# Patient Record
Sex: FEMALE | Race: WHITE | NOT HISPANIC OR LATINO | Employment: OTHER | URBAN - NONMETROPOLITAN AREA
[De-identification: names, ages, dates, MRNs, and addresses within clinical notes are randomized per-mention and may not be internally consistent; named-entity substitution may affect disease eponyms.]

---

## 2017-03-30 ENCOUNTER — HOSPITAL ENCOUNTER (OUTPATIENT)
Dept: RADIOLOGY | Facility: OTHER | Age: 72
End: 2017-03-30

## 2017-03-30 ENCOUNTER — HISTORY (OUTPATIENT)
Dept: RADIOLOGY | Facility: OTHER | Age: 72
End: 2017-03-30

## 2017-03-30 DIAGNOSIS — Z12.31 ENCOUNTER FOR SCREENING MAMMOGRAM FOR MALIGNANT NEOPLASM OF BREAST: ICD-10-CM

## 2018-01-04 NOTE — PROGRESS NOTES
Patient Information     Patient Name MRN Sex Kiera Julien [id number] Female 1945      Progress Notes by Krystin Velarde at 3/30/2017 11:08 AM     Author:  Krystin Velarde Service:  (none) Author Type:  (none)     Filed:  3/30/2017 11:08 AM Date of Service:  3/30/2017 11:08 AM Status:  Signed     :  Krystin Velarde            Falls Risk Criteria:    Age 65 and older or under age 4        Sensory deficits    Poor vision    Use of ambulatory aides    Impaired judgment    Unable to walk independently    Meets High Risk criteria for falls:  Yes               1.  Do you have dizziness or vertigo?    no                    2.  Do you need help standing or walking?   no                 3.  Have you fallen within the last 6 months?    no           4.  Has the patient been fasting?      no       If any risks are marked Yes, the following interventions are utilized:    Do not leave patient unattended     Assist patient in the dressing room and bathroom    Have ambulatory aides available throughout procedure    Involve patient s family if available

## 2018-02-13 ENCOUNTER — DOCUMENTATION ONLY (OUTPATIENT)
Dept: FAMILY MEDICINE | Facility: OTHER | Age: 73
End: 2018-02-13

## 2018-02-13 PROBLEM — G60.9 HEREDITARY AND IDIOPATHIC PERIPHERAL NEUROPATHY: Status: ACTIVE | Noted: 2018-02-13

## 2018-02-13 PROBLEM — M94.9 DISORDER OF BONE AND CARTILAGE: Status: ACTIVE | Noted: 2018-02-13

## 2018-02-13 PROBLEM — H81.10 BENIGN POSITIONAL VERTIGO: Status: ACTIVE | Noted: 2018-02-13

## 2018-02-13 PROBLEM — M89.9 DISORDER OF BONE AND CARTILAGE: Status: ACTIVE | Noted: 2018-02-13

## 2018-02-13 PROBLEM — C50.919 MALIGNANT NEOPLASM OF FEMALE BREAST (H): Status: ACTIVE | Noted: 2018-02-13

## 2018-02-13 PROBLEM — M81.0 OSTEOPOROSIS: Status: ACTIVE | Noted: 2018-02-13

## 2018-02-13 PROBLEM — E78.5 HYPERLIPIDEMIA: Status: ACTIVE | Noted: 2018-02-13

## 2018-02-13 RX ORDER — SIMVASTATIN 20 MG
1 TABLET ORAL AT BEDTIME
COMMUNITY
Start: 2013-02-07 | End: 2023-12-28

## 2018-02-13 RX ORDER — ALENDRONATE SODIUM 10 MG/1
1 TABLET ORAL EVERY MORNING
COMMUNITY
Start: 2013-02-07

## 2018-02-13 RX ORDER — LEVOTHYROXINE SODIUM 75 UG/1
1 TABLET ORAL DAILY
COMMUNITY
Start: 2013-02-07

## 2018-04-13 ENCOUNTER — HOSPITAL ENCOUNTER (OUTPATIENT)
Dept: MAMMOGRAPHY | Facility: OTHER | Age: 73
Discharge: HOME | End: 2018-04-13
Payer: MEDICARE

## 2018-04-13 DIAGNOSIS — Z12.39 SCREENING BREAST EXAMINATION: ICD-10-CM

## 2018-04-13 PROCEDURE — 77067 SCR MAMMO BI INCL CAD: CPT

## 2022-07-08 ENCOUNTER — OFFICE VISIT (OUTPATIENT)
Dept: FAMILY MEDICINE | Facility: OTHER | Age: 77
End: 2022-07-08
Attending: PHYSICIAN ASSISTANT
Payer: MEDICARE

## 2022-07-08 ENCOUNTER — HOSPITAL ENCOUNTER (OUTPATIENT)
Dept: GENERAL RADIOLOGY | Facility: OTHER | Age: 77
Discharge: HOME | End: 2022-07-08
Attending: PHYSICIAN ASSISTANT
Payer: MEDICARE

## 2022-07-08 VITALS
WEIGHT: 137.4 LBS | DIASTOLIC BLOOD PRESSURE: 80 MMHG | RESPIRATION RATE: 16 BRPM | OXYGEN SATURATION: 99 % | SYSTOLIC BLOOD PRESSURE: 138 MMHG | TEMPERATURE: 97.5 F | HEART RATE: 70 BPM

## 2022-07-08 DIAGNOSIS — M79.622 PAIN OF LEFT UPPER ARM: ICD-10-CM

## 2022-07-08 DIAGNOSIS — M25.512 LEFT ANTERIOR SHOULDER PAIN: Primary | ICD-10-CM

## 2022-07-08 PROCEDURE — 73030 X-RAY EXAM OF SHOULDER: CPT | Mod: LT

## 2022-07-08 PROCEDURE — G0463 HOSPITAL OUTPT CLINIC VISIT: HCPCS

## 2022-07-08 PROCEDURE — 99203 OFFICE O/P NEW LOW 30 MIN: CPT | Performed by: PHYSICIAN ASSISTANT

## 2022-07-08 PROCEDURE — 73060 X-RAY EXAM OF HUMERUS: CPT | Mod: LT

## 2022-07-08 RX ORDER — LISINOPRIL 5 MG/1
5 TABLET ORAL DAILY
COMMUNITY
Start: 2022-04-28

## 2022-07-08 ASSESSMENT — PAIN SCALES - GENERAL: PAINLEVEL: MODERATE PAIN (5)

## 2022-07-08 NOTE — NURSING NOTE
Chief Complaint   Patient presents with     Arm Pain     Fell on left shoulder last night    left shoulder pain       Medication reconciliation completed.    FOOD SECURITY SCREENING QUESTIONS:    The next two questions are to help us understand your food security.  If you are feeling you need any assistance in this area, we have resources available to support you today.    Hunger Vital Signs:  Within the past 12 months we worried whether our food would run out before we got money to buy more. Never  Within the past 12 months the food we bought just didn't last and we didn't have money to get more. Never    Initial /80 (BP Location: Left arm, Patient Position: Sitting, Cuff Size: Adult Regular)   Pulse 70   Temp 97.5  F (36.4  C) (Temporal)   Resp 16   Wt 62.3 kg (137 lb 6.4 oz)   SpO2 99%   Breastfeeding No  There is no height or weight on file to calculate BMI.       Beth Sprague LPN .......  7/8/2022  11:19 AM

## 2022-07-08 NOTE — PATIENT INSTRUCTIONS
Please refer to your AVS for follow up and pain/symptoms management recommendations (I.e.: medications, helpful conservative treatment modalities, appropriate follow up if need to a specialist or family practice, etc.). Please return to urgent care if your symptoms change or worsen.     Discharge instructions:  -If you were prescribed a medication(s), please take this as prescribed/directed  -Monitor your symptoms, if changing/worsening, return to UC/ER or PCP for follow up    For pain control - we recommend alternating Tylenol   -Daily maximum of Tylenol is 4000mg (recommend staying under 3000mg)  - Heat, ice, gentle stretching (among other remedies: biofreeze/icy hot, etc).   - MRI order placed, they will call you to schedule this - if you feel better and feel you do not need the MRI, that is fine

## 2022-07-08 NOTE — PROGRESS NOTES
"ASSESSMENT/PLAN:    I have reviewed the nursing notes.  I have reviewed the findings, diagnosis, plan and need for follow up with the patient.    1. Left anterior shoulder pain  2. Pain of left upper arm  - XR Humerus Left G/E 2 Views  - MR Shoulder Left w/o Contrast; Future  - Vital signs stable.  Physical exam consistent with possible internal derangement of rotator cuff. MRI ordered, they will contact patient to schedule. Sling offered, she declined. Will offer additional recommendations such as orthopedics based off MRI results. Recommend alternating Tylenol and ibuprofen every 4-6 hours if able, do not exceed daily limits as reviewed on AVS (4000 mg of Tylenol daily, 3200 mg of ibuprofen daily), alternate heat and ice, gentle range of motion as tolerated. Patient runs into any difficulties/setbacks during recovery they should follow-up with her PCP or orthopedics for reevaluation. Patient is in agreement and understanding of the above treatment plan. All questions and concerns were addressed and answered to patient's satisfaction. AVS reviewed with patient.     Discussed warning signs/symptoms indicative of need to f/u    Follow up if symptoms persist or worsen or concerns    I explained my diagnostic considerations and recommendations to the patient, who voiced understanding and agreement with the treatment plan. All questions were answered. We discussed potential side effects of any prescribed or recommended therapies, as well as expectations for response to treatments.    Jeannette Duong PA-C  7/8/2022  11:19 AM    HPI:    Kiera Cifuentes is a 76 year old female  who presents to [facility] today for concerns of left shoulder pain. She fell on arm late last night and tripped over a cable cord. Pain is over lateral upper arm and shoulder.     RHD/LHD: RHD  Pain: 5/10  Quality of pain: \"just hurts\"  Location: upper arm and shoulder on left  Palliative: sitting still with arm against body  Provocative: " cannot lift arm comfortably  Numbness, tingling, burning: none  Mechanical symptoms (locking, popping, catching): none  Bruising/edema/erythema: none   Treatments tried: Tylenol x1  Prior falls, injuries or trauma: none  Additional symptoms to report: none    Allergies: Sulfa    Past Medical History:   Diagnosis Date     Malignant neoplasm of female breast (H)     No Comments Provided     Past Surgical History:   Procedure Laterality Date     LAPAROSCOPIC TUBAL LIGATION      03/10/1980     LUMPECTOMY BREAST      2005, Lumpectomy wit axillary node dissection for T1N1M0 ER Positive left breast cancer     OTHER SURGICAL HISTORY      1984,[id number],OTHER,Removal of hydrosalpinx and right ovary     OTHER SURGICAL HISTORY      Feb-Aug 2006,2060,CHEMOTHERAPY,Chemotherapy with doxorubicin and cyclophosphamide followed by paclitaxel     Social History     Tobacco Use     Smoking status: Former Smoker     Packs/day: 0.50     Years: 5.00     Pack years: 2.50     Types: Cigarettes     Quit date: 1973     Years since quittin.4     Smokeless tobacco: Never Used   Substance Use Topics     Alcohol use: Not on file     Current Outpatient Medications   Medication Sig Dispense Refill     alendronate (FOSAMAX) 10 MG tablet Take 1 tablet by mouth every morning . Take on empty stomach with full glass of water. Do not lie down for 1 hr.       CALCIUM PO Take 1 tablet by mouth 2 times daily (with meals)       Cholecalciferol (VITAMIN D3) 2000 UNITS CAPS Take 1 capsule by mouth daily.       cholecalciferol 25 MCG (1000 UT) TABS Take 1 tablet by mouth daily       levothyroxine (SYNTHROID/LEVOTHROID) 75 MCG tablet Take 1 tablet by mouth daily       lisinopril (ZESTRIL) 5 MG tablet Take 5 mg by mouth daily       alendronate (FOSAMAX) 70 MG tablet Take 1 tablet by mouth every 7 days. Every week in the morning, at least 30 minutes before the first food, beverage, or medication of the day       Levothyroxine Sodium 100 MCG  CAPS Take  by mouth. Take one by mouth daily (Patient not taking: Reported on 7/8/2022)       simvastatin (ZOCOR) 20 MG tablet Take 1 tablet by mouth At Bedtime (Patient not taking: Reported on 7/8/2022)       simvastatin (ZOCOR) 40 MG tablet Take 1 tablet by mouth every evening. (Patient not taking: Reported on 7/8/2022)       tamoxifen (NOLVADEX) 20 MG tablet Take 1 tablet by mouth daily. (Patient not taking: Reported on 7/8/2022)       Allergies   Allergen Reactions     Sulfonamide Derivatives      Sulfa Antibiotics     Past medical history, past surgical history, current medications and allergies reviewed and accurate to the best of my knowledge.      ROS:  Refer to HPI    /80 (BP Location: Left arm, Patient Position: Sitting, Cuff Size: Adult Regular)   Pulse 70   Temp 97.5  F (36.4  C) (Temporal)   Resp 16   Wt 62.3 kg (137 lb 6.4 oz)   SpO2 99%   Breastfeeding No     EXAM:  General Appearance: Well appearing 76 year old female, appropriate appearance for age. No acute distress   Respiratory: normal chest wall and respirations.  Normal effort.  Clear to auscultation bilaterally, no wheezing, crackles or rhonchi.  No increased work of breathing.  No cough appreciated.  Cardiac: RRR with no murmurs  MSK:  LEFT SHOULDER PHYSICAL EXAMINATION:  Gross Examination: shoulders appear symmetrical in appearance, no signs of bony deformity over the AC, clavicle or glenohumeral joints. No signs of previous or current trauma. No signs of ecchymosis. Skin is intact.     Palpation: Tenderness to palpation: AC joint, proximal biceps tendon and supraspinatus insertion    ROM: flexion 40*, extension 40*, abduction 100*, adduction 35*, IR declined, ER declined    Special Testing:   Shoulder Strength:    Speed/Yergason (bicep): negative      Instability:    Apprehension: not done    Cross Over: not done     Impingement:    Neer: negative    Tobar: positive    Empty Can (supraspinatus): positive    Neurovascular  status: distal pulses are intact and are 2+. Two point discrimination intact. Distal capillary refill intact < 3 seconds.     Neck: Skin CDI. Normal curvature in sagittal and coronal planes. Non tender over muscular and bony structures. ROM: flexion, extension, right and left rotational, side bend to L and R - full in all planes.   Motor Testing:   - C4: scapular stabilization - normal  - C5: shoulder abduction, IR and ER, elbow flexion palm up - normal  - C6: elbow flexion thumb up, wrist extension, wrist supination - normal  - C7: elbow flexion, wrist flexion, wrist pronation - normal  - C8: MCP/PIP flexion, DIP motion, thumb extension - normal  - T1: finger abduction - normal  - Spurling Compression test for radiculopathy: - negtive  Dermatological: no rashes noted of exposed skin  Psychological: normal affect, alert, oriented, and pleasant.     Labs:  None     Xray:  Results for orders placed or performed in visit on 07/08/22   XR Humerus Left G/E 2 Views     Status: None    Narrative    Exam: XR SHOULDER 2 VIEW LEFT, XR HUMERUS LEFT G/E 2 VIEWS     History:Female, age 76 years, fall, left shoulder pain; Left anterior  shoulder pain    Comparison:  None    Technique: Two views of the left shoulder and left humerus are  submitted    Findings: Bones are normally mineralized. No evidence of acute or  subacute fracture.  No evidence of dislocation.           Impression    Impression:  No evidence of acute or subacute bony abnormality.     Calcific tendinosis of the rotator cuff.    Moderate AC joint degenerative change with findings suggesting  subacromial impingement.    MRI would better characterize.    GARY ALANIS MD         SYSTEM ID:  [id number]   XR Shoulder Left 2 Views     Status: None    Narrative    Exam: XR SHOULDER 2 VIEW LEFT, XR HUMERUS LEFT G/E 2 VIEWS     History:Female, age 76 years, fall, left shoulder pain; Left anterior  shoulder pain    Comparison:  None    Technique: Two views of the left  shoulder and left humerus are  submitted    Findings: Bones are normally mineralized. No evidence of acute or  subacute fracture.  No evidence of dislocation.           Impression    Impression:  No evidence of acute or subacute bony abnormality.     Calcific tendinosis of the rotator cuff.    Moderate AC joint degenerative change with findings suggesting  subacromial impingement.    MRI would better characterize.    GARY ALANIS MD         SYSTEM ID:  [id number]

## 2022-10-18 ENCOUNTER — HOSPITAL ENCOUNTER (OUTPATIENT)
Dept: MRI IMAGING | Facility: OTHER | Age: 77
Discharge: HOME | End: 2022-10-18
Attending: PHYSICIAN ASSISTANT | Admitting: PHYSICIAN ASSISTANT
Payer: MEDICARE

## 2022-10-18 DIAGNOSIS — M25.512 LEFT ANTERIOR SHOULDER PAIN: ICD-10-CM

## 2022-10-18 DIAGNOSIS — M79.622 PAIN OF LEFT UPPER ARM: ICD-10-CM

## 2022-10-18 PROCEDURE — G1010 CDSM STANSON: HCPCS

## 2023-05-04 ENCOUNTER — APPOINTMENT (OUTPATIENT)
Dept: CT IMAGING | Facility: OTHER | Age: 78
End: 2023-05-04
Attending: FAMILY MEDICINE
Payer: COMMERCIAL

## 2023-05-04 ENCOUNTER — HOSPITAL ENCOUNTER (EMERGENCY)
Facility: OTHER | Age: 78
Discharge: HOME | End: 2023-05-04
Attending: FAMILY MEDICINE | Admitting: FAMILY MEDICINE
Payer: COMMERCIAL

## 2023-05-04 VITALS
WEIGHT: 143 LBS | SYSTOLIC BLOOD PRESSURE: 166 MMHG | HEART RATE: 94 BPM | TEMPERATURE: 98.5 F | BODY MASS INDEX: 28.07 KG/M2 | OXYGEN SATURATION: 95 % | RESPIRATION RATE: 16 BRPM | DIASTOLIC BLOOD PRESSURE: 91 MMHG | HEIGHT: 60 IN
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
## 2023-05-04 DIAGNOSIS — S13.4XXA WHIPLASH INJURY TO NECK, INITIAL ENCOUNTER: ICD-10-CM

## 2023-05-04 LAB
ALBUMIN SERPL BCG-MCNC: 4.1 G/DL (ref 3.5–5.2)
ALBUMIN UR-MCNC: NEGATIVE MG/DL
ALP SERPL-CCNC: 66 U/L (ref 35–104)
ALT SERPL W P-5'-P-CCNC: 11 U/L (ref 10–35)
ANION GAP SERPL CALCULATED.3IONS-SCNC: 13 MMOL/L (ref 7–15)
APPEARANCE UR: CLEAR
APTT PPP: 26 SECONDS (ref 22–38)
AST SERPL W P-5'-P-CCNC: 14 U/L (ref 10–35)
BASOPHILS # BLD AUTO: 0 10E3/UL (ref 0–0.2)
BASOPHILS NFR BLD AUTO: 1 %
BILIRUB SERPL-MCNC: 0.3 MG/DL
BILIRUB UR QL STRIP: NEGATIVE
BUN SERPL-MCNC: 15.4 MG/DL (ref 8–23)
CALCIUM SERPL-MCNC: 9 MG/DL (ref 8.8–10.2)
CHLORIDE SERPL-SCNC: 101 MMOL/L (ref 98–107)
COLOR UR AUTO: YELLOW
CREAT SERPL-MCNC: 1.04 MG/DL (ref 0.51–0.95)
DEPRECATED HCO3 PLAS-SCNC: 23 MMOL/L (ref 22–29)
EOSINOPHIL # BLD AUTO: 0.1 10E3/UL (ref 0–0.7)
EOSINOPHIL NFR BLD AUTO: 2 %
ERYTHROCYTE [DISTWIDTH] IN BLOOD BY AUTOMATED COUNT: 13.3 % (ref 10–15)
ETHANOL SERPL-MCNC: <0.01 G/DL
GFR SERPL CREATININE-BSD FRML MDRD: 55 ML/MIN/1.73M2
GLUCOSE SERPL-MCNC: 104 MG/DL (ref 70–99)
GLUCOSE UR STRIP-MCNC: NEGATIVE MG/DL
HCT VFR BLD AUTO: 33.9 % (ref 35–47)
HGB BLD-MCNC: 11.3 G/DL (ref 11.7–15.7)
HGB UR QL STRIP: NEGATIVE
HOLD SPECIMEN: NORMAL
IMM GRANULOCYTES # BLD: 0 10E3/UL
IMM GRANULOCYTES NFR BLD: 0 %
INR PPP: 1.02 (ref 0.85–1.15)
KETONES UR STRIP-MCNC: ABNORMAL MG/DL
LEUKOCYTE ESTERASE UR QL STRIP: NEGATIVE
LIPASE SERPL-CCNC: 32 U/L (ref 13–60)
LYMPHOCYTES # BLD AUTO: 2.3 10E3/UL (ref 0.8–5.3)
LYMPHOCYTES NFR BLD AUTO: 27 %
MCH RBC QN AUTO: 30 PG (ref 26.5–33)
MCHC RBC AUTO-ENTMCNC: 33.3 G/DL (ref 31.5–36.5)
MCV RBC AUTO: 90 FL (ref 78–100)
MONOCYTES # BLD AUTO: 0.7 10E3/UL (ref 0–1.3)
MONOCYTES NFR BLD AUTO: 8 %
MUCOUS THREADS #/AREA URNS LPF: PRESENT /LPF
NEUTROPHILS # BLD AUTO: 5.4 10E3/UL (ref 1.6–8.3)
NEUTROPHILS NFR BLD AUTO: 62 %
NITRATE UR QL: NEGATIVE
NRBC # BLD AUTO: 0 10E3/UL
NRBC BLD AUTO-RTO: 0 /100
PH UR STRIP: 6.5 [PH] (ref 5–9)
PLATELET # BLD AUTO: 342 10E3/UL (ref 150–450)
POTASSIUM SERPL-SCNC: 3.9 MMOL/L (ref 3.4–5.3)
PROT SERPL-MCNC: 7.1 G/DL (ref 6.4–8.3)
RBC # BLD AUTO: 3.77 10E6/UL (ref 3.8–5.2)
RBC URINE: <1 /HPF
SODIUM SERPL-SCNC: 137 MMOL/L (ref 136–145)
SP GR UR STRIP: 1.02 (ref 1–1.03)
UROBILINOGEN UR STRIP-MCNC: NORMAL MG/DL
WBC # BLD AUTO: 8.6 10E3/UL (ref 4–11)
WBC URINE: <1 /HPF

## 2023-05-04 PROCEDURE — 83690 ASSAY OF LIPASE: CPT | Performed by: FAMILY MEDICINE

## 2023-05-04 PROCEDURE — 72125 CT NECK SPINE W/O DYE: CPT

## 2023-05-04 PROCEDURE — 81001 URINALYSIS AUTO W/SCOPE: CPT | Performed by: FAMILY MEDICINE

## 2023-05-04 PROCEDURE — 99283 EMERGENCY DEPT VISIT LOW MDM: CPT | Performed by: FAMILY MEDICINE

## 2023-05-04 PROCEDURE — 99285 EMERGENCY DEPT VISIT HI MDM: CPT | Mod: 25 | Performed by: FAMILY MEDICINE

## 2023-05-04 PROCEDURE — 80053 COMPREHEN METABOLIC PANEL: CPT | Performed by: FAMILY MEDICINE

## 2023-05-04 PROCEDURE — 74177 CT ABD & PELVIS W/CONTRAST: CPT

## 2023-05-04 PROCEDURE — 85730 THROMBOPLASTIN TIME PARTIAL: CPT | Performed by: FAMILY MEDICINE

## 2023-05-04 PROCEDURE — 82077 ASSAY SPEC XCP UR&BREATH IA: CPT | Performed by: FAMILY MEDICINE

## 2023-05-04 PROCEDURE — 36415 COLL VENOUS BLD VENIPUNCTURE: CPT | Performed by: FAMILY MEDICINE

## 2023-05-04 PROCEDURE — 85610 PROTHROMBIN TIME: CPT | Performed by: FAMILY MEDICINE

## 2023-05-04 PROCEDURE — 85025 COMPLETE CBC W/AUTO DIFF WBC: CPT | Performed by: FAMILY MEDICINE

## 2023-05-04 PROCEDURE — 70450 CT HEAD/BRAIN W/O DYE: CPT

## 2023-05-04 PROCEDURE — 999N000104 CT THORACIC SPINE RECONSTRUCTED

## 2023-05-04 PROCEDURE — 250N000011 HC RX IP 250 OP 636: Performed by: FAMILY MEDICINE

## 2023-05-04 PROCEDURE — 999N000104 CT LUMBAR SPINE RECONSTRUCTED

## 2023-05-04 RX ORDER — LIDOCAINE 40 MG/G
CREAM TOPICAL
Status: DISCONTINUED | OUTPATIENT
Start: 2023-05-04 | End: 2023-05-04 | Stop reason: HOSPADM

## 2023-05-04 RX ORDER — IOPAMIDOL 755 MG/ML
83 INJECTION, SOLUTION INTRAVASCULAR ONCE
Status: COMPLETED | OUTPATIENT
Start: 2023-05-04 | End: 2023-05-04

## 2023-05-04 RX ORDER — SODIUM CHLORIDE 9 MG/ML
INJECTION, SOLUTION INTRAVENOUS CONTINUOUS
Status: DISCONTINUED | OUTPATIENT
Start: 2023-05-04 | End: 2023-05-04 | Stop reason: HOSPADM

## 2023-05-04 RX ADMIN — IOPAMIDOL 83 ML: 755 INJECTION, SOLUTION INTRAVENOUS at 14:03

## 2023-05-04 ASSESSMENT — ACTIVITIES OF DAILY LIVING (ADL)
ADLS_ACUITY_SCORE: 35
ADLS_ACUITY_SCORE: 35

## 2023-05-04 ASSESSMENT — ENCOUNTER SYMPTOMS
FEVER: 0
CHILLS: 0
SHORTNESS OF BREATH: 0
CHEST TIGHTNESS: 0

## 2023-05-04 NOTE — ED NOTES
Patient back from CT and rates pain 0/10.  Settled into bed,  at bedside.  No other needs identified.
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
## 2023-12-28 ENCOUNTER — HOSPITAL ENCOUNTER (EMERGENCY)
Facility: OTHER | Age: 78
Discharge: HOME | End: 2023-12-28
Attending: PHYSICIAN ASSISTANT | Admitting: PHYSICIAN ASSISTANT
Payer: MEDICARE

## 2023-12-28 ENCOUNTER — APPOINTMENT (OUTPATIENT)
Dept: CT IMAGING | Facility: OTHER | Age: 78
End: 2023-12-28
Attending: PHYSICIAN ASSISTANT
Payer: MEDICARE

## 2023-12-28 VITALS
HEIGHT: 61 IN | DIASTOLIC BLOOD PRESSURE: 97 MMHG | WEIGHT: 136 LBS | HEART RATE: 75 BPM | SYSTOLIC BLOOD PRESSURE: 121 MMHG | OXYGEN SATURATION: 97 % | BODY MASS INDEX: 25.68 KG/M2 | RESPIRATION RATE: 20 BRPM

## 2023-12-28 DIAGNOSIS — K44.9 HIATAL HERNIA: ICD-10-CM

## 2023-12-28 DIAGNOSIS — R07.9 CHEST PAIN: ICD-10-CM

## 2023-12-28 LAB
ALBUMIN SERPL BCG-MCNC: 4.4 G/DL (ref 3.5–5.2)
ALP SERPL-CCNC: 98 U/L (ref 40–150)
ALT SERPL W P-5'-P-CCNC: 13 U/L (ref 0–50)
ANION GAP SERPL CALCULATED.3IONS-SCNC: 11 MMOL/L (ref 7–15)
AST SERPL W P-5'-P-CCNC: 21 U/L (ref 0–45)
ATRIAL RATE - MUSE: 91 BPM
BASOPHILS # BLD AUTO: 0.1 10E3/UL (ref 0–0.2)
BASOPHILS NFR BLD AUTO: 1 %
BILIRUB DIRECT SERPL-MCNC: <0.2 MG/DL (ref 0–0.3)
BILIRUB SERPL-MCNC: 0.2 MG/DL
BUN SERPL-MCNC: 14.7 MG/DL (ref 8–23)
CALCIUM SERPL-MCNC: 9 MG/DL (ref 8.8–10.2)
CHLORIDE SERPL-SCNC: 101 MMOL/L (ref 98–107)
CREAT SERPL-MCNC: 1.05 MG/DL (ref 0.51–0.95)
D DIMER PPP FEU-MCNC: 0.83 UG/ML FEU (ref 0–0.5)
DEPRECATED HCO3 PLAS-SCNC: 25 MMOL/L (ref 22–29)
DIASTOLIC BLOOD PRESSURE - MUSE: NORMAL MMHG
EGFRCR SERPLBLD CKD-EPI 2021: 54 ML/MIN/1.73M2
EOSINOPHIL # BLD AUTO: 0.3 10E3/UL (ref 0–0.7)
EOSINOPHIL NFR BLD AUTO: 4 %
ERYTHROCYTE [DISTWIDTH] IN BLOOD BY AUTOMATED COUNT: 13.4 % (ref 10–15)
GLUCOSE SERPL-MCNC: 103 MG/DL (ref 70–99)
HCT VFR BLD AUTO: 35 % (ref 35–47)
HGB BLD-MCNC: 11.6 G/DL (ref 11.7–15.7)
HOLD SPECIMEN: NORMAL
IMM GRANULOCYTES # BLD: 0 10E3/UL
IMM GRANULOCYTES NFR BLD: 0 %
INTERPRETATION ECG - MUSE: NORMAL
LIPASE SERPL-CCNC: 40 U/L (ref 13–60)
LYMPHOCYTES # BLD AUTO: 1.3 10E3/UL (ref 0.8–5.3)
LYMPHOCYTES NFR BLD AUTO: 17 %
MCH RBC QN AUTO: 29.7 PG (ref 26.5–33)
MCHC RBC AUTO-ENTMCNC: 33.1 G/DL (ref 31.5–36.5)
MCV RBC AUTO: 90 FL (ref 78–100)
MONOCYTES # BLD AUTO: 0.7 10E3/UL (ref 0–1.3)
MONOCYTES NFR BLD AUTO: 9 %
NEUTROPHILS # BLD AUTO: 5.4 10E3/UL (ref 1.6–8.3)
NEUTROPHILS NFR BLD AUTO: 69 %
NRBC # BLD AUTO: 0 10E3/UL
NRBC BLD AUTO-RTO: 0 /100
NT-PROBNP SERPL-MCNC: 113 PG/ML (ref 0–1800)
P AXIS - MUSE: 66 DEGREES
PLATELET # BLD AUTO: 343 10E3/UL (ref 150–450)
POTASSIUM SERPL-SCNC: 4 MMOL/L (ref 3.4–5.3)
PR INTERVAL - MUSE: 190 MS
PROT SERPL-MCNC: 7.6 G/DL (ref 6.4–8.3)
QRS DURATION - MUSE: 82 MS
QT - MUSE: 362 MS
QTC - MUSE: 445 MS
R AXIS - MUSE: 5 DEGREES
RBC # BLD AUTO: 3.9 10E6/UL (ref 3.8–5.2)
SODIUM SERPL-SCNC: 137 MMOL/L (ref 135–145)
SYSTOLIC BLOOD PRESSURE - MUSE: NORMAL MMHG
T AXIS - MUSE: 29 DEGREES
TROPONIN T SERPL HS-MCNC: 8 NG/L
TROPONIN T SERPL HS-MCNC: 8 NG/L
VENTRICULAR RATE- MUSE: 91 BPM
WBC # BLD AUTO: 7.8 10E3/UL (ref 4–11)

## 2023-12-28 PROCEDURE — 83880 ASSAY OF NATRIURETIC PEPTIDE: CPT | Mod: GZ | Performed by: PHYSICIAN ASSISTANT

## 2023-12-28 PROCEDURE — 85025 COMPLETE CBC W/AUTO DIFF WBC: CPT | Performed by: PHYSICIAN ASSISTANT

## 2023-12-28 PROCEDURE — 83690 ASSAY OF LIPASE: CPT | Performed by: PHYSICIAN ASSISTANT

## 2023-12-28 PROCEDURE — 93010 ELECTROCARDIOGRAM REPORT: CPT | Performed by: INTERNAL MEDICINE

## 2023-12-28 PROCEDURE — 99285 EMERGENCY DEPT VISIT HI MDM: CPT | Mod: 25 | Performed by: PHYSICIAN ASSISTANT

## 2023-12-28 PROCEDURE — 80053 COMPREHEN METABOLIC PANEL: CPT | Performed by: PHYSICIAN ASSISTANT

## 2023-12-28 PROCEDURE — 71275 CT ANGIOGRAPHY CHEST: CPT | Mod: ME

## 2023-12-28 PROCEDURE — 250N000013 HC RX MED GY IP 250 OP 250 PS 637: Performed by: PHYSICIAN ASSISTANT

## 2023-12-28 PROCEDURE — 250N000011 HC RX IP 250 OP 636: Performed by: PHYSICIAN ASSISTANT

## 2023-12-28 PROCEDURE — 85379 FIBRIN DEGRADATION QUANT: CPT | Performed by: PHYSICIAN ASSISTANT

## 2023-12-28 PROCEDURE — 93005 ELECTROCARDIOGRAM TRACING: CPT | Performed by: PHYSICIAN ASSISTANT

## 2023-12-28 PROCEDURE — 84484 ASSAY OF TROPONIN QUANT: CPT | Mod: 91 | Performed by: PHYSICIAN ASSISTANT

## 2023-12-28 PROCEDURE — 36415 COLL VENOUS BLD VENIPUNCTURE: CPT | Performed by: PHYSICIAN ASSISTANT

## 2023-12-28 PROCEDURE — 99284 EMERGENCY DEPT VISIT MOD MDM: CPT | Performed by: PHYSICIAN ASSISTANT

## 2023-12-28 RX ORDER — MAGNESIUM HYDROXIDE/ALUMINUM HYDROXICE/SIMETHICONE 120; 1200; 1200 MG/30ML; MG/30ML; MG/30ML
15 SUSPENSION ORAL ONCE
Status: COMPLETED | OUTPATIENT
Start: 2023-12-28 | End: 2023-12-28

## 2023-12-28 RX ORDER — IOPAMIDOL 755 MG/ML
66 INJECTION, SOLUTION INTRAVASCULAR ONCE
Status: COMPLETED | OUTPATIENT
Start: 2023-12-28 | End: 2023-12-28

## 2023-12-28 RX ORDER — ASPIRIN 81 MG/1
324 TABLET, CHEWABLE ORAL ONCE
Status: COMPLETED | OUTPATIENT
Start: 2023-12-28 | End: 2023-12-28

## 2023-12-28 RX ADMIN — ASPIRIN 81 MG CHEWABLE TABLET 324 MG: 81 TABLET CHEWABLE at 11:53

## 2023-12-28 RX ADMIN — IOPAMIDOL 66 ML: 755 INJECTION, SOLUTION INTRAVENOUS at 13:03

## 2023-12-28 RX ADMIN — ALUMINUM HYDROXIDE, MAGNESIUM HYDROXIDE, AND DIMETHICONE 15 ML: 200; 20; 200 SUSPENSION ORAL at 14:50

## 2023-12-28 ASSESSMENT — ENCOUNTER SYMPTOMS
COUGH: 0
CHILLS: 0
SHORTNESS OF BREATH: 0
HEMATURIA: 0
ABDOMINAL PAIN: 0
FEVER: 0

## 2023-12-28 ASSESSMENT — ACTIVITIES OF DAILY LIVING (ADL): ADLS_ACUITY_SCORE: 35

## 2023-12-28 NOTE — ED TRIAGE NOTES
"Patient here with chest pain that is on and off since yesterday.  Patient states drinking water helps and it is a sharp pain.  Patient with no known cardiac history.    BP (!) 163/72   Pulse 89   Resp 18   Ht 1.549 m (5' 1\")   Wt 61.7 kg (136 lb)   SpO2 98%   BMI 25.70 kg/m         Triage Assessment (Adult)       Row Name 12/28/23 1135          Triage Assessment    Airway WDL WDL        Respiratory WDL    Respiratory WDL WDL        Skin Circulation/Temperature WDL    Skin Circulation/Temperature WDL WDL        Cardiac WDL    Cardiac WDL X        Peripheral/Neurovascular WDL    Peripheral Neurovascular WDL WDL        Cognitive/Neuro/Behavioral WDL    Cognitive/Neuro/Behavioral WDL WDL                     "

## 2023-12-28 NOTE — ED PROVIDER NOTES
History     Chief Complaint   Patient presents with    Chest Pain     HPI  Kiera Cifuentes is a 78 year old female who presents to the ED for evaluation of chest pain. Patient here with chest pain that is on and off since yesterday.  Patient states drinking water helps and it is a sharp pain.  Patient with no known cardiac history.      Allergies:  Allergies   Allergen Reactions    Bee Venom      Hornet sting    Sulfonamide Derivatives      Sulfa Antibiotics       Problem List:    Patient Active Problem List    Diagnosis Date Noted    Malignant neoplasm of female breast (H) 02/13/2018     Priority: Medium     Overview:   T1N1M0 ER Positive, Stage llA      Benign positional vertigo 02/13/2018     Priority: Medium    Hyperlipidemia 02/13/2018     Priority: Medium    Disorder of bone and cartilage 02/13/2018     Priority: Medium     Overview:   Left hip      Osteoporosis 02/13/2018     Priority: Medium    Hereditary and idiopathic peripheral neuropathy 02/13/2018     Priority: Medium     Overview:   Secondary to chemotherapy threatment for breast cancer      Postmenopausal bleeding 01/23/2012     Priority: Medium    Use of selective estrogen receptor modulators (SERMs) 01/23/2012     Priority: Medium     Overview:   completed in 2011      Graves disease 01/20/2012     Priority: Medium     Overview:   Treated with  radioactive thyroid: subsequent hypothyroidism          Past Medical History:    Past Medical History:   Diagnosis Date    Malignant neoplasm of female breast (H)        Past Surgical History:    Past Surgical History:   Procedure Laterality Date    LAPAROSCOPIC TUBAL LIGATION      03/10/1980    LUMPECTOMY BREAST      07/26/2005, Lumpectomy wit axillary node dissection for T1N1M0 ER Positive left breast cancer    OTHER SURGICAL HISTORY      06/13/1984,186597,OTHER,Removal of hydrosalpinx and right ovary    OTHER SURGICAL HISTORY      Feb-Aug 2006,206041,CHEMOTHERAPY,Chemotherapy with doxorubicin and  "cyclophosphamide followed by paclitaxel       Family History:    Family History   Problem Relation Age of Onset    Heart Disease Mother         Heart Disease,Coronary disease    Breast Cancer Mother         Cancer-breast    Breast Cancer Sister         Cancer-breast    Breast Cancer Maternal Aunt         Cancer-breast    Heart Disease Father         Heart Disease,Coronary disease       Social History:  Marital Status:  Single [1]  Social History     Tobacco Use    Smoking status: Former     Packs/day: 0.50     Years: 5.00     Additional pack years: 0.00     Total pack years: 2.50     Types: Cigarettes     Quit date: 1973     Years since quittin.9    Smokeless tobacco: Never   Vaping Use    Vaping Use: Never used   Substance Use Topics    Alcohol use: Not Currently    Drug use: Never     Types: Other     Comment: Drug use: Not Asked        Medications:    alendronate (FOSAMAX) 70 MG tablet  Cholecalciferol (VITAMIN D3) 2000 UNITS CAPS  Levothyroxine Sodium 100 MCG CAPS  lisinopril (ZESTRIL) 5 MG tablet  omeprazole (PRILOSEC) 20 MG DR capsule  simvastatin (ZOCOR) 40 MG tablet  alendronate (FOSAMAX) 10 MG tablet  CALCIUM PO  cholecalciferol 25 MCG (1000 UT) TABS  levothyroxine (SYNTHROID/LEVOTHROID) 75 MCG tablet  tamoxifen (NOLVADEX) 20 MG tablet          Review of Systems   Constitutional:  Negative for chills and fever.   HENT:  Negative for congestion.    Eyes:  Negative for visual disturbance.   Respiratory:  Negative for cough and shortness of breath.    Cardiovascular:  Positive for chest pain.   Gastrointestinal:  Negative for abdominal pain.   Genitourinary:  Negative for hematuria.   Allergic/Immunologic: Negative for immunocompromised state.   Neurological:  Negative for syncope.       Physical Exam   BP: (!) 163/72  Pulse: 89  Resp: 18  Height: 154.9 cm (5' 1\")  Weight: 61.7 kg (136 lb)  SpO2: 98 %      Physical Exam  Constitutional:       General: She is not in acute distress.     Appearance: She " is well-developed. She is not diaphoretic.   HENT:      Head: Normocephalic and atraumatic.   Eyes:      General: No scleral icterus.     Conjunctiva/sclera: Conjunctivae normal.   Cardiovascular:      Rate and Rhythm: Normal rate and regular rhythm.   Pulmonary:      Effort: Pulmonary effort is normal.      Breath sounds: Normal breath sounds.   Abdominal:      Palpations: Abdomen is soft.      Tenderness: There is no abdominal tenderness.   Musculoskeletal:         General: No deformity.      Cervical back: Neck supple.   Lymphadenopathy:      Cervical: No cervical adenopathy.   Skin:     General: Skin is warm and dry.      Coloration: Skin is not jaundiced.      Findings: No rash.   Neurological:      General: No focal deficit present.      Mental Status: She is alert. Mental status is at baseline.   Psychiatric:         Mood and Affect: Mood normal.         Behavior: Behavior normal.         ED Course                 Procedures         EKG read at 1139. HR 91, NSR, no ST changes.     Critical Care time:  none               Results for orders placed or performed during the hospital encounter of 12/28/23 (from the past 24 hour(s))   EKG 12-lead, tracing only   Result Value Ref Range    Systolic Blood Pressure  mmHg    Diastolic Blood Pressure  mmHg    Ventricular Rate 91 BPM    Atrial Rate 91 BPM    DE Interval 190 ms    QRS Duration 82 ms     ms    QTc 445 ms    P Axis 66 degrees    R AXIS 5 degrees    T Axis 29 degrees    Interpretation ECG       Sinus rhythm  Inferior infarct , age undetermined  Abnormal ECG  No previous ECGs available  Confirmed by MD KENROY, ZULEMA ([id number]) on 12/28/2023 2:36:50 PM     [facility]    Narrative    The following orders were created for panel order [facility] [facility].  Procedure                               Abnormality         Status                     ---------                               -----------         ------                     Extra Blue Top Tube[[id number]]                               Final result               Extra Red Top Tube[[id number]]                               Final result               Extra Green Top (Lithium...[[id number]]                      Final result               Extra Purple Top Tube[[id number]]                            Final result               Extra Green Top (Lithium...[[id number]]                      Final result                 Please view results for these tests on the individual orders.   Extra Blue Top Tube   Result Value Ref Range    Hold Specimen JIC    Extra Red Top Tube   Result Value Ref Range    Hold Specimen JIC    Extra Green Top (Lithium Heparin) Tube   Result Value Ref Range    Hold Specimen JIC    Extra Purple Top Tube   Result Value Ref Range    Hold Specimen JIC    Extra Green Top (Lithium Heparin) ON ICE   Result Value Ref Range    Hold Specimen JIC    CBC with platelets differential    Narrative    The following orders were created for panel order CBC with platelets differential.  Procedure                               Abnormality         Status                     ---------                               -----------         ------                     CBC with platelets and d...[[id number]]  Abnormal            Final result                 Please view results for these tests on the individual orders.   Basic metabolic panel   Result Value Ref Range    Sodium 137 135 - 145 mmol/L    Potassium 4.0 3.4 - 5.3 mmol/L    Chloride 101 98 - 107 mmol/L    Carbon Dioxide (CO2) 25 22 - 29 mmol/L    Anion Gap 11 7 - 15 mmol/L    Urea Nitrogen 14.7 8.0 - 23.0 mg/dL    Creatinine 1.05 (H) 0.51 - 0.95 mg/dL    GFR Estimate 54 (L) >60 mL/min/1.73m2    Calcium 9.0 8.8 - 10.2 mg/dL    Glucose 103 (H) 70 - 99 mg/dL   Troponin T, High Sensitivity   Result Value Ref Range    Troponin T, High Sensitivity 8 <=14 ng/L   Hepatic panel   Result Value Ref Range    Protein Total 7.6 6.4 - 8.3 g/dL    Albumin 4.4 3.5 - 5.2 g/dL    Bilirubin Total 0.2 <=1.2 mg/dL     Alkaline Phosphatase 98 40 - 150 U/L    AST 21 0 - 45 U/L    ALT 13 0 - 50 U/L    Bilirubin Direct <0.20 0.00 - 0.30 mg/dL   Lipase   Result Value Ref Range    Lipase 40 13 - 60 U/L   Nt probnp inpatient (BNP)   Result Value Ref Range    N terminal Pro BNP Inpatient 113 0 - 1,800 pg/mL   CBC with platelets and differential   Result Value Ref Range    WBC Count 7.8 4.0 - 11.0 10e3/uL    RBC Count 3.90 3.80 - 5.20 10e6/uL    Hemoglobin 11.6 (L) 11.7 - 15.7 g/dL    Hematocrit 35.0 35.0 - 47.0 %    MCV 90 78 - 100 fL    MCH 29.7 26.5 - 33.0 pg    MCHC 33.1 31.5 - 36.5 g/dL    RDW 13.4 10.0 - 15.0 %    Platelet Count 343 150 - 450 10e3/uL    % Neutrophils 69 %    % Lymphocytes 17 %    % Monocytes 9 %    % Eosinophils 4 %    % Basophils 1 %    % Immature Granulocytes 0 %    NRBCs per 100 WBC 0 <1 /100    Absolute Neutrophils 5.4 1.6 - 8.3 10e3/uL    Absolute Lymphocytes 1.3 0.8 - 5.3 10e3/uL    Absolute Monocytes 0.7 0.0 - 1.3 10e3/uL    Absolute Eosinophils 0.3 0.0 - 0.7 10e3/uL    Absolute Basophils 0.1 0.0 - 0.2 10e3/uL    Absolute Immature Granulocytes 0.0 <=0.4 10e3/uL    Absolute NRBCs 0.0 10e3/uL   D dimer quantitative   Result Value Ref Range    D-Dimer Quantitative 0.83 (H) 0.00 - 0.50 ug/mL FEU    Narrative    This D-dimer assay is intended for use in conjunction with a clinical pretest probability assessment model to exclude pulmonary embolism (PE) and deep venous thrombosis (DVT) in outpatients suspected of PE or DVT. The cut-off value is 0.50 ug/mL FEU.    For patients 50 years of age or older, the application of age-adjusted cut-off values for D-Dimer may increase the specificity without significant effect on sensitivity. The literature suggested calculation age adjusted cut-off in ug/L = age in years x 10 ug/L. The results in this laboratory are reported as ug/mL rather than ug/L. The calculation for age adjusted cut off in ug/mL= age in years x 0.01 ug/mL. For example, the cut off for a 76 year old male  is 77 x 0.01 ug/mL = 0.76 ug/mL (760 ug/L).    M Meronhifelisa et al. Age adjusted D-dimer cut-off levels to rule out pulmonary embolism: The ADJUST-PE Study. JAMARCUS 2014;311:6064-2778.; HJ Kit et al. Diagnostic accuracy of conventional or age adjusted D-dimer cutoff values in older patients with suspected venous thromboembolism. Systemic review and meta-analysis. BMJ 2013:346:f2492.   CT Chest Pulmonary Embolism w Contrast    Narrative    PROCEDURE: CT CHEST PULMONARY EMBOLISM W CONTRAST 12/28/2023 1:07 PM    HISTORY: intermittent chest pain, hx cancer. elevated ddimer. PE?  lungs?    COMPARISONS: None.    Meds/Dose Given: 66 ml isovue 370    TECHNIQUE: CT angiogram of the chest with sagittal and coronal MIP  reconstructions    FINDINGS: There are no pulmonary emboli. The thoracic aorta appears  normal. The heart is normal in size.    There is a large hiatal hernia. There is some left lung scarring  stable from previous examination May 2020. There is some dependent  atelectasis in the bases.    The hilar and mediastinal lymph nodes are normal in caliber. Axillary  and supraclavicular lymph nodes appear normal. The upper portion of  the lateral liver spleen and pancreas appear normal. The adrenal  glands are normal. Degenerative changes are present in the thoracic  spine         Impression    IMPRESSION: No pulmonary emboli.    Large hiatal hernia.    HUNG PALMA MD         SYSTEM ID:  [id number]   Troponin T, High Sensitivity   Result Value Ref Range    Troponin T, High Sensitivity 8 <=14 ng/L       Medications   aspirin (ASA) chewable tablet 324 mg (324 mg Oral $Given 12/28/23 1153)   iopamidol (ISOVUE-370) solution 66 mL (66 mLs Intravenous $Given 12/28/23 1303)   alum & mag hydroxide-simethicone (MAALOX) suspension 15 mL (15 mLs Oral $Given 12/28/23 1450)       Assessments & Plan (with Medical Decision Making)   Pt nontoxic in NAD. Heart, lung, bowel sounds normal, abd soft, nontender to palpation, nondistended.  VSS, afebrile.  She has good equal peripheral pulses in all extremities.    Lab findings showed no leukocytosis, stable mild anemia and stable renal insufficiency.  She has 2 negative nonchanging troponins.  Her D-dimer was slightly elevated.  EKG nonischemic.    CT:  No pulmonary emboli.     Large hiatal hernia.     -Overall she seems to be doing very well.  She gets very minimal and brief pains in her chest.  I discussed options with her including continued observation in the ED with serial troponins versus close outpatient management follow-up.  She is aware that she is at a moderate risk for major adverse cardiac event in the near future.  She would like to go home at this time.  I did discuss with her that she does have a large hiatal hernia and that it is increasing in size from previous CT scan.  This may be causing some of her symptoms but it is hard to determine.  However, we will place her on omeprazole.  Referral is placed for her to obtain outpatient echocardiogram and follow-up with cardiology as well as general surgery to discuss whether any procedure would be warranted for her hernia.    Strict return precautions are given to the pt, they will return if symptoms are worsening or concerning. The pt understands and agrees with the plan and they are discharged.     Luciano Montanez PA-C      I have reviewed the nursing notes.    I have reviewed the findings, diagnosis, plan and need for follow up with the patient.          Discharge Medication List as of 12/28/2023  2:48 PM        START taking these medications    Details   omeprazole (PRILOSEC) 20 MG DR capsule Take 1 capsule (20 mg) by mouth daily for 30 days, Disp-30 capsule, R-0, E-Prescribe             Final diagnoses:   Chest pain   Hiatal hernia       12/28/2023   [facility] AND [facility]       Luciano Montanez PA  12/28/23 [id number]

## 2023-12-28 NOTE — DISCHARGE INSTRUCTIONS
Get plenty of fluids and rest.  As discussed, all of your vital signs, EKG and physical exam appear very well.  The CT scan was negative other than seen a large hiatal hernia, it seems like it is getting larger since your previous study.  This may be the culprit that is causing your discomfort.  However, we discussed we still want to further rule out that it is not your heart.  We discussed further observation in the ED today with repeat studies versus close outpatient observation and management.  We decided to continue as an outpatient.  A referral was placed for you to obtain outpatient echocardiogram and follow-up with cardiology.  I also placed a general surgery consult to discuss the hernia.  We will try some antacid type medications that I placed in a prescription to [facility].  Return if there are worsening or concerning symptoms including intractable pain, radiating discomfort, lightheadedness, nausea or vomiting etc.

## (undated) RX ORDER — LORAZEPAM 2 MG/ML
INJECTION INTRAMUSCULAR
Status: DISPENSED
Start: 2023-05-04

## (undated) RX ORDER — MAGNESIUM HYDROXIDE/ALUMINUM HYDROXICE/SIMETHICONE 120; 1200; 1200 MG/30ML; MG/30ML; MG/30ML
SUSPENSION ORAL
Status: DISPENSED
Start: 2023-12-28

## (undated) RX ORDER — ASPIRIN 81 MG/1
TABLET, CHEWABLE ORAL
Status: DISPENSED
Start: 2023-12-28

## (undated) RX ORDER — GABAPENTIN 300 MG/1
CAPSULE ORAL
Status: DISPENSED
Start: 2023-05-04